# Patient Record
Sex: MALE | Race: WHITE
[De-identification: names, ages, dates, MRNs, and addresses within clinical notes are randomized per-mention and may not be internally consistent; named-entity substitution may affect disease eponyms.]

---

## 2018-10-02 NOTE — ASMTTLCEVL
TLC Evaluation - Basic Information

 

Evaluation Start Date and     10/02/2018 06:45 PM

Time                          

Hospital Status               Answers:  M1 Hold                               

72-hr M1 Hold Start Date      10/02/2018 04:05 PM

and Time                      

Patient statement             

Notes:

 Rox been really struggling with suicidal thoughts and depression but more anxiety than 

depression.  

Narrative                     

Notes:

Pt is a 17 year old  male who presented to [facility] Ed with gastric but also presents with 

complaints of worsening despair and depression over the last 2 months since his break up with his 

GF 2 months ago.  Pt stated he feels like he has been doing everything he can to feel better and he 


has reached out to suicide prevention hotlines, and reached out to his school counselors and 

therapist at [facility] 1x a month but states he thinks  it still gets the best of 

him. Pt states he needs a med change. He states he has been taking Prozac for 1 .5 years and has 

had negative side effects such as low libido, apathy and low energy and stated these side effects 

are the same regardless of the dose. Pt reports he has been on 40 mg at one point and had the same 

side effects.  Pt also reports he was on Zoloft but that drug was not helpful.  Pt reported last 

night he had a plan and thought about hanging himself but stated his protective factors are,  I 

always want to give it one more chance and stated,  I dont think I would have done it.Pt is denying 


SI at this time and states, I do want to get my life back on track.

Diagnosis History             

Notes:

Pt reported a hx of anxiety and depression.

Prior suicide attempts        

Notes:

Pt denied any suicide attempts but reported a hx of cutting. Per ED physician report, pt reported a 


suicide attempt 1 month ago which pt is denying to both this writer and nurse. 

Prior hospitalizations        

Notes:

Pt denied any prior hospitalizations.

Treatment Responses           

Notes:

N/A

History of violence           

Notes:

Pt denied any hx of violence.

Therapist:                    [facility]

Medications                   

(name, dosage, route, freq    

uency)                        

Notes:

Prozac  10 mg; nexium

Allergies/Reaction            

Notes:

Nka

Sleep                         

Notes:

Pt reports decreased sleep.

Appetite                      

Notes:

Pt reports a decrease in appetite and weight loss of 20 lbs in the past 2 months

Medical/Surgical history      

Notes:

Pt reports having gastrointestinal issues. 

Substance use history         

(frequency, intensity, his    

tory, duration)               

Notes:

Pt denied any drug use but utox was positive for THC. Pt reports drinking alcohol occasionally 

which he just started doing 2 months ago. Pt reports he never gets drunk, just drinks socially.

Family composition            

Notes:

Pt reports his parents live in [place] and reported that his parents do not want to acknowledge his 

depression, and became upset initially when they found out he was on antidepressants. Pt sated 

eventually they became more accepting of the issue.

Need for family               Answers:  No                                    

participation in                                                              

patient's care                                                                

Family                        

psychiatric/substance         

abuse history                 

Notes:

Pt did not report any family hx. 

Developmental history         

Notes:

Pt reported growing up in [place] and [place] and reported having a good childhood. Pt denied any 

childhood abuse but stated, My parents often pushed me to do things I dont want to do, like they 

really want me to be an .  Pt denied any childhood abuse. 

Abuse concerns                Answers:  None                                  

Marital status/children       

Notes:

Unmarried, no children.

Living situation              

Notes:

Pt lives in [place], no children.

Sexual                        

history/orientation           

Notes:

Heterosexual

Peer support/family           

strengths                     

Notes:

Pt reports he has really good friends. 

Education level/history       

Notes:

Pt reports he is sophomore at [employer], almost a avelino.  Pt stated he normally is doing really 

well but this semester he has started slipping a little.

Work history                  

Notes:

Pt reported he works about 7 hours a week as a .

                      

Notes:

None reported.

Legal                         

Notes:

Pt denied any legal problem.

Anglican/Spiritual           

Notes:

None reported. 

Leisure                       

Notes:

Playing tennis, hanging with friends, watching interesting shows.[employer]

Collateral                    

Notes:

[employer]

Patient's strengths           Answers:  Artistic/Creative/Musical             

(Please select at least                                                       

TWO strengths):                                                               

                                        Intelligent                           

                                        Willingness                           

TLC Evaluation - Mental Status Exam

 

Appearance:                   Answers:  Appropriate                           

Eye Contact:                  Answers:  Good/Direct                           

Mood:                         Answers:  Sad                                   

Affect:                       Answers:  Anxious                               

Behavior:                     Answers:  Cooperative                           

                                        Anxious                               

Speech:                       Answers:  Relevant                              

                                        Logical                               

                                        Clear                                 

Thought Process:              Answers:  Organized                             

                                        Oriented                              

                                        Alert                                 

Insight:                      Answers:  Good                                  

Judgement:                    Answers:  Fair                                  

Depression                    Answers:  Diminished Interest                   

Signs/Symptoms:                                                               

                                        Diminished Pleasure                   

                                        Sad Mood                              

Anxiety Signs/Symptoms        Answers:  Generalized Anxiety                   

Hallucinations:               Answers:  None                                  

Pt reported to have           Answers:  No                                    

suicidal/self-injuring                                                        

ideation/behavior?                                                            

Pt reported to be making      Answers:  Yes                                   

suicidal/self-injuring                                                        

threats?                                                                      

Pt reported to be making      Answers:  No                                    

aggression/assault                                                            

threats?                                                                      

Pt exhibits inability to      Answers:  No                                    

care for self/grave                                                           

disability?                                                                   

Ideation/behavior is          Answers:  Yes                                   

chronic?                                                                      

Patient has a specific        Answers:  Yes                                   

plan?                                                                         

Pt has access to means to     Answers:  Yes                                   

execute the plan?                                                             

Ideation involves             Answers:  Yes                                   

serious/lethal intent?                                                        

Ideation has                  Answers:  No                                    

delusional/hallucinatory                                                      

content?                                                                      

History of                    Answers:  Yes                                   

suicidal/self-injuring                                                        

ideation, behavior, or                                                        

threats?                                                                      

History of                    Answers:  No                                    

aggressive/assaultive                                                         

ideation, behavior, or                                                        

threats?                                                                      

History of serious            Answers:  No                                    

physical harm to                                                              

self/others while in                                                          

treatment setting?                                                            

TLC Evaluation - Suicide/Homicide Risk

 

Suicide Risk Factors:         Answers:  < 20 or > 40 Years of Age             

                                        Anxiety/Panic, Severe                 

                                        Major Depression                      

                                        Self-Harm Behaviors                   

Current Suicidal              Answers:  No                                    

Ideation?                                                                     

Current Suicidal Ideation     Answers:  Yes                                   

in the Past 48 Hours?                                                         

Current Suicidal Ideation     Answers:  Yes                                   

in the Past Month?                                                            

Current Suicidal              Answers:  Yes                                   

Ideation, Worst Ever?                                                         

Suicide Internal              Answers:  Absence of Psychosis                  

Protective Factors:                                                           

Suicide External              Answers:  Positive Therapeutic                  

Protective Factors:                     Relationships                         

                                        Social Support                        

Ranking of patient's          Answers:  Severe                                

suicidal risk:                                                                

Ranking of patient's          Answers:  Low                                   

homicidal risk:                                                               

TLC Evaluation - Wrap-up

 

AXIS I Diagnosis (include     

DSM-V and ICD-10              

codes), must also be          

entered in                    

[employer], which is the        

source of truth.              

Notes:

Major Depressive Disorder, recurrent, severe  296.33  (F33.2) 

Generalized Anxiety Disorder  300.02  (F41.1)

 In consultation with [facility] ED physician, Jasiel Alexandra MD and on-call psychiatrist, Moira Osman MD, both concurred that pt appears to meet 27-65 criteria requiring psychiatric 

hospitalization as pt appears to be at risk of harm to self due to a mental illness condition. Pt 

was given the 3N prohibited belongings list while in the ED.

Evaluation End Date and       10/02/2018 08:15 PM

Time (HH:ISABELLA):                 

 

Date Signed:  10/02/2018 08:15 PM

Electronically Signed By:Gisela Alexander

## 2018-10-02 NOTE — EDPHY
H & P


Stated Complaint: gen abd pain, N/V, feeling anxious w/SI


Time Seen by Provider: 10/02/18 15:58


HPI/ROS: 


HPI:  This is a 17-year-old male who presents with





Chief Complaint: gen abd pain, N/V, feeling anxious w/SI





Location: psych 


Quality:  Suicidal ideation


Duration:  2 months


Signs and Symptoms: no fever, no nausea, no vomiting, no hematemesis, no blood 

in stool, no abdominal bloating, no diarrhea, no back pain, no urinary symptoms

, no testicular/groin pain, no indigestion, no chest pain, no shortness of 

breath


Timing:  Worse over the last week


Severity:  Severe


Context:  Patient has a history of major depression, anxiety disorder, gastric 

also presents with complaints of worsening despair and depression over the last 

2 months since his break-up with his girlfriend 2 months ago.  He reports that 

he is taking his Zoloft daily but does not like the decreased libido side 

effects.  He reports that 1 month ago he attempted suicide.  He also has a 

history of cutting with razors denies any recent cutting.  Patient reports that 

he came to the emergency room today because he does not feel safe being by 

himself.  He is originally from [place], [place].  Is the computer science 

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  Patient reports that he has had 

decreased appetite, increased sleeping patterns, excessive crying.  Patient 

reports that over the last week he has had multiple thoughts during the day of 

hanging himself in order to and"the pain."Patient has a history of gastric 

ulcer diagnosed 2 years ago by endoscopy from a gastroenterologist and all [place], [place].  He takes Nexium 20 mg twice daily.  He complains of generalized 

abdominal pain worse in the epigastric area accompanied by nausea.  He believes 

when he is crying he vomits due to"being worked up." He denies any fever, back 

pain, testicular groin pain, urinary symptoms.  He has not eaten in 1 day due 

to decreased appetite.  Denies any diarrhea. 


Modifying Factors:  None





Comment: 








ROS:  A comprehensive 10 system review of systems is otherwise negative aside 

from elements mentioned in the history of present illness. 





MEDICAL/SURGICAL/SOCIAL HISTORY: 


Medical history:  Gastric ulcer, anxiety, depression


Surgical history:  Denies


Social history:  Never smoked.  Family history noncontributory.








CONSTITUTIONAL:  Flat affect, nontoxic appearing, teenage  male, awake 

and alert, no obvious distress


HEENT: Atraumatic and normocephalic, PERRL, EOMI.  Nares patent; no rhinorrhea;

  no nasal mucosal edema. Tympanic membranes clear. Oropharynx clear, no 

exudate and moist pink mucosa.  Airway patent.  No lymphadenopathy.  No 

meningismus.


Cardiovascular: Normal S1/S2, regular rate, regular rhythm, without murmur rub 

or gallop.


PULMONARY/CHEST:  Symmetrical and nontender. Clear to auscultation bilaterally. 

Good air movement. No accessory muscle usage.


ABDOMEN:  Soft, nondistended, nontender, no rebound, no guarding, no peritoneal 

signs, no masses or organomegaly. No CVAT.


EXTREMITIES:  2/2 pulses, strength 5/5, no deformities, no clubbing, no 

cyanosis or edema.


NEUROLOGICAL: no focal neuro deficits.  GCS 15.


SKIN: Warm and dry, no erythema. no rash.  Good capillary refill. 


PSYCH:  Very self critical, Poor eye contact, no flight of ideas, organized 

thought process, fair insight and judgment, no auditory hallucinations, no 

visual hallucinations,+ suicidal ideation with a plan, no homicidal ideation, 

no paranoia








Source: Patient


Exam Limitations: No limitations





- Medical/Surgical History


Hx Asthma: No


Hx Chronic Respiratory Disease: No


Hx Diabetes: No


Hx Cardiac Disease: No


Hx Renal Disease: No


Hx Cirrhosis: No


Hx Alcoholism: No


Hx HIV/AIDS: No


Hx Splenectomy or Spleen Trauma: No


Other PMH: gastric ulcer.  anxiety depression





- Social History


Smoking Status: Never smoked


Constitutional: 


 Initial Vital Signs











Temperature (C)  36.5 C   10/02/18 14:42


 


Heart Rate  85   10/02/18 14:42


 


Respiratory Rate  18   10/02/18 14:42


 


Blood Pressure  134/84 H  10/02/18 14:42


 


O2 Sat (%)  97   10/02/18 14:42








 











O2 Delivery Mode               Room Air














Allergies/Adverse Reactions: 


 





No Known Allergies Allergy (Unverified 10/02/18 14:41)


 








Home Medications: 














 Medication  Instructions  Recorded


 


Nexium  10/02/18


 


Prozac 10 MG (*)  10/02/18














Medical Decision Making





- Diagnostics


Imaging Results: 


 Imaging Impressions





Abdomen X-Ray  10/02/18 16:09


Impression: Negative.











ED Course/Re-evaluation: 


1605: Placed on M1 hold upon arrival due to patient having severe major 

depression with suicidal ideation with a plan.


Labs, abdominal x-ray to evaluate for free air secondary to gastric perforation

, oral medications, urine drug screen ordered


I suspect that his abdominal pain is related to his gastric ulcer and anxiety.  

Patient was given GI cocktail with adequate relief of pain.


Abdomen is soft and nontender.  Doubt surgical process. 


1640:  Urine drug screen positive for marijuana. 


1645:  Abdominal x-ray reviewed via PACs and shows nonobstructive bowel gas 

pattern.  No free air. 


1755:  Labs reviewed.  No signs of leukocytosis/anemia/platelet dysfunction/SON/

elevated LFTs/electrolyte imbalance.  Medically clear for mental health 

evaluation. 


2052:  Mental health recommends inpatient psychiatric admission.  Patient has 

been accepted at [address] [place] [address] by Dr. Osman. EMTALA form completed by Dr. Alexandra. 














This patient was seen under the supervision of my secondary supervising 

physician.  I evaluated care for this patient independently.  Discussed this 

patient with Dr. Alexandra.  





Differential Diagnosis: 


Differential diagnosis includes but is not limited to major depression, bipolar 

disorder, suicidal ideation, generalized anxiety disorder.








- Data Points


Laboratory Results: 


 Laboratory Results





 10/02/18 16:50 





 10/02/18 16:50 





 











  10/02/18 10/02/18 10/02/18





  16:50 16:50 16:00


 


WBC    9.07 10^3/uL 10^3/uL  





    (3.80-9.50)  


 


RBC    7.89 10^6/uL H 10^6/uL  





    (4.40-6.38)  


 


Hgb    15.6 g/dL g/dL  





    (13.7-17.5)  


 


Hct    49.2 % %  





    (40.0-51.0)  


 


MCV    62.4 fL L fL  





    (81.5-99.8)  


 


MCH    19.8 pg L pg  





    (27.9-34.1)  


 


MCHC    31.7 g/dL L g/dL  





    (32.4-36.7)  


 


RDW    19.3 % H %  





    (11.5-15.2)  


 


Plt Count    215 10^3/uL 10^3/uL  





    (150-400)  


 


MPV    TNP   





    


 


Neut % (Auto)    67.7 % %  





    (39.3-74.2)  


 


Lymph % (Auto)    22.2 % %  





    (15.0-45.0)  


 


Mono % (Auto)    7.2 % %  





    (4.5-13.0)  


 


Eos % (Auto)    2.3 % %  





    (0.6-7.6)  


 


Baso % (Auto)    0.3 % %  





    (0.3-1.7)  


 


Nucleat RBC Rel Count    0.0 % %  





    (0.0-0.2)  


 


Absolute Neuts (auto)    6.14 10^3/uL 10^3/uL  





    (1.70-6.50)  


 


Absolute Lymphs (auto)    2.01 10^3/uL 10^3/uL  





    (1.00-3.00)  


 


Absolute Monos (auto)    0.65 10^3/uL 10^3/uL  





    (0.30-0.80)  


 


Absolute Eos (auto)    0.21 10^3/uL 10^3/uL  





    (0.03-0.40)  


 


Absolute Basos (auto)    0.03 10^3/uL 10^3/uL  





    (0.02-0.10)  


 


Absolute Nucleated RBC    0.00 10^3/uL 10^3/uL  





    (0-0.01)  


 


Immature Gran %    0.3 % %  





    (0.0-1.1)  


 


Immature Gran #    0.03 10^3/uL 10^3/uL  





    (0.00-0.10)  


 


Platelet Estimate    Pending   





    


 


Smear Review By    Pending   





    


 


Sodium  139 mEq/L mEq/L    





   (135-145)   


 


Potassium  4.0 mEq/L mEq/L    





   (3.3-5.0)   


 


Chloride  102 mEq/L mEq/L    





   ()   


 


Carbon Dioxide  28 mEq/l mEq/l    





   (22-31)   


 


Anion Gap  9 mEq/L mEq/L    





   (8-16)   


 


BUN  10 mg/dL mg/dL    





   (7-23)   


 


Creatinine  0.9 mg/dL mg/dL    





   (0.7-1.3)   


 


Estimated GFR  > 60     





    


 


Glucose  95 mg/dL mg/dL    





   ()   


 


Calcium  10.4 mg/dL mg/dL    





   (8.5-10.4)   


 


Total Bilirubin  0.9 mg/dL mg/dL    





   (0.1-1.4)   


 


AST  20 IU/L IU/L    





   (17-59)   


 


ALT  26 IU/L IU/L    





   (21-72)   


 


Alkaline Phosphatase  88 IU/L IU/L    





   ()   


 


Total Protein  7.9 g/dL g/dL    





   (6.3-8.2)   


 


Albumin  4.6 g/dL g/dL    





   (3.5-5.0)   


 


Urine Opiates Screen      NEGATIVE 





     (NEGATIVE) 


 


Urine Barbiturates      NEGATIVE 





     (NEGATIVE) 


 


Ur Phencyclidine Scrn      NEGATIVE 





     (NEGATIVE) 


 


Ur Amphetamine Screen      NEGATIVE 





     (NEGATIVE) 


 


U Benzodiazepines Scrn      NEGATIVE 





     (NEGATIVE) 


 


Urine Cocaine Screen      NEGATIVE 





     (NEGATIVE) 


 


U Marijuana (THC) Screen      NON-NEGATIVE  H 





     (NEGATIVE) 


 


Ethyl Alcohol  < 10 mg/dL mg/dL    





   (0-10)   











Medications Given: 


 








Discontinued Medications





Al Hydroxide/Mg Hydroxide (Maalox Susp)  30 ml PO ONCE ONE


   Stop: 10/02/18 16:10


   Last Admin: 10/02/18 16:42 Dose:  30 ml


Hyoscyamine Sulfate (Levsin, Hyomax-Sl)  0.25 mg PO ONCE ONE


   Stop: 10/02/18 16:10


   Last Admin: 10/02/18 16:42 Dose:  0.25 mg


Lidocaine (Lidocaine 2% Viscous)  15 ml PO ONCE ONE


   Stop: 10/02/18 16:10


   Last Admin: 10/02/18 16:42 Dose:  15 ml








Departure





- Departure


Disposition: [facility] IP


Clinical Impression: 


 Severe major depression without psychotic features, Depression with suicidal 

ideation





Condition: Fair

## 2018-10-02 NOTE — ASMTTCLDSP
TLC Discharge Disposition

 

Disposition:                  Answers:  Admit                                 

Discharge                     

Concerns/Recommendations:     

Notes:

In consultation with [facility] ED physician, Jasiel Alexandra MD and on-call psychiatrist, Moira Osman MD, both concurred that pt appears to meet 27-65 criteria requiring psychiatric 

hospitalization as pt appears to be at risk of harm to self due to a mental illness condition. Pt 

was given the 3N prohibited belongings list while in the ED.

Was patient given the         Answers:  Yes                                   

Inpatient Behavioral                                                          

Health Prohibited                                                             

Belongings List while in                                                      

the ED?                                                                       

For inpatient                 Moira Osman MD

admission, the following      

psychiatrist agreed to        

accept patient for            

admission to Behavioral       

Health ([facility]):              

 

Date Signed:  10/02/2018 08:16 PM

Electronically Signed By:Gisela Alexander

## 2018-10-03 NOTE — BCON
INTERNAL MEDICINE CONSULTATION



DATE OF CONSULTATION:  10/03/2018



REFERRING PHYSICIAN:  Moira Osman MD



REASON FOR REFERRAL:  Medical clearance for inpatient behavioral health stay.



HISTORY OF PRESENT ILLNESS:  This patient came to the emergency department yesterday complaining of d
epression for 2 months since a break-up with his girlfriend.  He reported a suicide attempt approxima
tely a month prior and some cutting with razors in the past.  He did not feel safe being by himself. 
 He was evaluated by the mental health team and admitted for further psychiatric care. 



He also was complaining of abdominal pain with a history of gastric ulcer and currently complains of 
abdominal pain and reports that he has not been taking his usual proton pump inhibitor for which his 
outpatient prescription is esomeprazole.  He is otherwise without any acute complaints.



PAST MEDICAL HISTORY:  

1.  Depression.

2.  Gastric ulcer.



MEDICATIONS:  

1.  Esomeprazole.

2.  Fluoxetine 10 mg daily.



ALLERGIES:  There are no known drug allergies.



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  He is a no
nsmoker and nondrinker.



FAMILY HISTORY:  Noncontributory.



REVIEW OF SYSTEMS:  He denies weight loss.  He reports his appetite has returned.  He denies nausea, 
vomiting, constipation, or diarrhea.  Otherwise, a 10-point review of systems is negative.



PHYSICAL EXAM:  VITAL SIGNS:  Blood pressure is 125/77, heart rate is 88, respiratory rate is 14, oxy
gen saturation is 97% on room air.  Temperature is 36.9 degrees centigrade.  His weight is 65.8 kg fo
r a body mass index of 20.8.  GENERAL:  This is a well-nourished, well-developed man sitting up in 
d, wearing street clothes, cooperative and in no acute distress.  HEENT:  Extraocular movements are i
ntact.  Mucous membranes are moist.  Dentition is in good condition.  He has an uncrowded airway, Mal
lampati class 2.  NECK:  Supple.  HEART:  Regular rate and rhythm with no murmurs, rubs, or gallops. 
 LUNGS:  Clear to auscultation bilaterally.  ABDOMEN:  Benign.  EXTREMITIES:  No cyanosis, clubbing, 
or edema.  NEUROLOGIC:  He is alert and oriented x3.  There is no focal weakness, and sensation is in
tact to light touch.



LABORATORY DATA:  From the emergency department:  CBC showed normal hemoglobin and hematocrit, but he
 had a low MCV at 62.4 and a low MCH at 19.8.  Peripheral smear showed polychromasia, microcytosis, a
nd elliptocytes.  Serum chemistry showed normal renal function, electrolytes, and liver functions.  H
emoglobin A1c was normal at 5.7.  Lipid panel showed a slightly high cholesterol at 177 with an LDL o
f 119, and an HDL of 38.  Toxicology screen in the urine was non-negative for marijuana, but otherwis
e negative for substances of abuse.  Toxicology screen in the serum was negative for ethyl alcohol.



ASSESSMENT/RECOMMENDATIONS:  

1.  Mental health issues pending further evaluation and management per Psychiatry and the mental [facility] team.

2.  Thalassemia.  He reports that his mother has it also.  He has a normal hemoglobin and hematocrit.
  There is no need for any further evaluation or management.  He can follow up with primary care.

3.  Abdominal pain with a prescription for esomeprazole and history of a gastric ulcer.  Pantoprazole
 has been ordered on a p.r.n. basis and I will schedule it at 40 mg twice daily.  He can follow up wi
th his primary care provider or Gastroenterology after discharge. 





I see no medical contraindications to this patient's continued stay on the inpatient behavioral healt
h unit or to any psychiatric medications or procedures. 



Thank you very much for including me in the care of this patient and please do not hesitate to contac
t me or the hospitalist service, should there be need for further medical evaluation.





Job #:  [id number]

## 2018-10-03 NOTE — BAPA
DATE OF SERVICE:  10/03/2018



CHIEF COMPLAINT:  "I went to the ER because I've been having increased suicidal 
thoughts and feeling more depressed and I just feel like I could use some help.
"


HISTORY OF PRESENT ILLNESS:  From the ER note dated 10/02/2018, the patient 
presented to the ER with worsening despair and depression over the past 2 
months since his break-up with his girlfriend 2 months ago.  The patient 
reports a recent suicide attempt 1 month ago.  There are no details regarding 
the suicide attempt noted in the ED note.  The patient reports a history of 
cutting with razors and denied any recent cutting.  The patient reports he 
presented to the emergency room because he does not feel safe being by himself.
  The patient was admitted involuntarily and is on an M1 hold due to being a 
danger to himself and is hospitalized for safety crisis stabilization and 
medication evaluation.  The patient describes to this NP circumstances that led 
to current hospitalization as a recent break-up about 2 months ago with his 
girlfriend, increased stressor with starting college, and reports a history of 
having suicidal ideation about every month or so.  The patient reports no 
history of specific plan to complete suicide.  The patient reports to this NP 
current mental health illness as depression and anxiety.  The patient reports 
he was not using alcohol or any other substances prior to his hospitalization 
and no alcohol or substances contributed to his current hospitalization.  The 
patient reports no current psychiatric symptoms to this NP.  The patient 
describes this NP abuse history as none.  The patient denies psychiatric 
symptoms, including symptoms of depression, andrea, anxiety, ADHD, OCD, PTSD, 
psychosis, and any other symptom of psychiatric disorder.  The patient does 
report history of depression symptoms and anxiety symptoms.  The patient 
reports he has felt depressed mood in the past, reports fatigue and loss of 
energy, feelings of worthlessness and excessive guilt, and does report a 
history of suicidal ideation.  The patient reports a history of anxiety symptoms
, including excessive worry.  Reports that at times it is difficult to control 
his worry, feels restless and keyed up and on edge at times, difficulty 
concentrating, and at times sleep disturbance. The patient describes to this NP 
current psychiatric symptoms are impacting managing his day-to-day life, 
described as attending to household responsibilities without difficulty.  The 
patient reports he works part time without difficulty.  The patient reports he 
is currently socially active and socializes without any difficulty.  The 
patient reports he gets along well with his family and his family is very 
supportive.  The patient reports he is doing well in school, currently getting A
's and B's in his [employer] year, and is currently studying computer programming.  
The patient reports hobbies as playing tennis and flying remote control 
airplanes.  The patient states he is generally satisfied with his life.  The 
patient denies current suicidal ideation and reports last suicidal ideation was 
yesterday prior to his admission.  The patient reports several protective 
factors or reasons to live, including family and friends.  The patient reports 
future goal as focusing more on school.  The patient reports his main support 
network is his family and reports "They are always there for me."  The patient 
denies current homicidal ideation.  The patient denies current self-injurious 
ideation.  The patient reports he currently sees his primary care provider for 
medications and sees a therapist at [facility].



PAST PSYCHIATRIC HISTORY:  The patient describes to this NP the following 
psychiatric history:  The patient reports past diagnoses of both depression and 
anxiety.  The patient reports past psychotropic medication trials as Prozac and 
Zoloft.  The patient reports he did not like the side effects from these 
medications, including feeling more fatigued after taking them and reports 
history of decreased libido on these medications.  The patient reports he has 
never been hospitalized in inpatient psychiatric hospital.  The patient denies 
any history of withdrawal from drugs or alcohol.  The patient denies history of 
a suicide attempt.  The patient reports self-injurious behavior approximately 8-
9 months ago and reports cutting. The patient reports he has never done this 
before and has not done it since.



ALLERGIES:  No known allergies.



CURRENT MEDICATIONS:  Prozac 10 mg p.o. at bedtime as the patient's current 
psychotropic medication for anxiety and depression.



PAST MEDICAL HISTORY:  The patient describes to this NP the following pertinent 
past medical history:  The patient denies history of neurological brain disease
, traumatic brain injury or concussions.  The patient denies any history of 
major illnesses and denies any history of major hospitalizations.



SOCIAL HISTORY:  The patient describes to this NP the following social history:
  The patient reports he was born in [place] and raised the majority of his 
life in [place], [place] by both parents.  The patient reports he currently 
lives in [place], [place] with 3 roommates.  The patient reports meeting all 
his developmental milestones.  Reports no learning delays or difficulties and 
describes his sexual orientation as heterosexual.  The patient reports he is 
currently not in a relationship, has never been  and has no children.  
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 
.  He states he is currently a avelino in college.  The patient denies 
any history of  duty, reports no Confucianism or spiritual practice, and 
denies any past or current legal problems.



SUBSTANCE USE HISTORY:  The patient reports he has drank alcohol 2-3 times in 
his entire life.  The patient denies a history of nicotine, marijuana, meth, 
cocaine, crack, heroin, prescription medication abuse, or any other abuse of 
substances.



FAMILY PSYCHIATRIC HISTORY:  The patient describes to this NP the following 
family psychiatric history:  The patient reports no family history of mental 
illness, no family history of suicide, and no family history of substance abuse.



ADMISSION LABS AND STUDIES:  CBC from 10/02/2018 within normal limits except 
RBC was elevated at 7.89, MCV was low at 62.4, MCH was low at 19.8, MCHC was 
low at 31.7, RDW was elevated at 19.3.  BMP from 10/02/2018 within normal 
limits.  Hemoglobin A1c from 10/02/2018 within normal limits.  Liver function 
tests from 10/02/2018 within normal limits.  Fasting lipid panel from 10/02/
2018 within normal limits except cholesterol was elevated at 177, non-HDL 
cholesterol was elevated at 139.  Toxicology screen from 10/02/2018 was non-
negative for THC, negative for all other substances of abuse, and negative for 
ethyl alcohol.



MENTAL STATUS EXAM:  



DIAGNOSIS:  

1.  Major depressive disorder, severe, with anxious distress.  

2.  Adjustment disorder with mixed disturbance of emotions and conduct.

3.  Cannabis use disorder is suspected.



FORMULATION:  The patient is a 17-year-old male, single, works part-time, is a 
full-time student in a [employer] program, living in [place], [place] with 3 roommates, who presents to the hospital involuntarily due to a 
risk to harm himself and is currently on an M1 hold. The patient requires 
continued inpatient care because of recent suicidal ideation.  The patient 
presents with problems of increased depression and suicidal ideation that have 
been steadily increasing over the past several months.  The patient's life has 
been affected by these problems, including the crisis that led to this 
hospitalization.  The exacerbation of symptoms was likely preceded by patient's 
recent break-up with his girlfriend approximately 2 months ago.  The patient 
has a past psychiatric history of depression and anxiety that have been treated 
with both Prozac and Zoloft and the response to treatment has been poor due to 
the patient's complaints of side effects.  The patient is a moderate to high 
suicide safety risk due to recent suicidal ideation.  Protective factors while 
hospitalized include ongoing safety checks, active involvement in treatment, 
and support from the treatment team.  The patient could benefit from inpatient 
hospitalization for safety crisis stabilization and medication evaluation.



PLAN:  

(1) Psychotropic medications:  After reviewing options, risks, and benefits 
with the patient, the patient agrees to discontinue Prozac and begin a trial of 
Effexor XR 75 mg p.o. daily.  No other medication changes at this time as more 
time is needed to determine ongoing tolerability and efficacy.  Plan is to 
continue to observe patient for response and side effects from medications, and 
ongoing monitoring and evaluation.  

(2) Review with patient informed consent and recommendations for psychotropic 
medication treatment listed below

(3) Labs: no additional labs at this time.

(4) Therapy: continue milieu and group therapy  

(5) Further investigation including gathering information from patients 
relatives and review of past case records to inform treatment plan.

(6) Safety/Wellness plan and follow-up outpatient appointments to be 
established prior to discharge.  Next steps are for patient to meet with care 
manager to plan a safe discharge plan and establish outpatient services for 
ongoing treatment.  

(7) Confer with inpatient treatment team regarding treatment plan.  

(8) Legal status: M1

(9) Consider discharge on Thursday if patient is in stable condition, safe, and 
has a safe discharge plan.   

(10) Substance abuse interventions: cannabis 



ESTIMATED LENGTH OF STAY: 1-3 days



PSYCHOTROPIC MEDICATION TREATMENT INFORMED CONSENT and RECOMMENDATIONS: Review 
nature of condition, diagnosis, and prognosis.  Review nature and purpose of 
psychotropic medication treatment.  Review type of psychotropic medications 
being ordered.  Review risk and benefits of psychotropic medication treatment.  
Review probable length of time will need to take medications.  Review risk and 
benefits of not undergoing psychotropic medication treatment.  Review 
alternative treatments to psychotropic medications.  Review psychotropic 
medications contraindications, drug-drug interactions, side effects, and 
importance of reporting any side effects to a psychiatric provider or nurse 
during inpatient hospitalization, and upon discharge to patients psychiatric 
outpatient provider, primary care provider, or other health care professional.  
Review importance of asking a nurse, psychiatric provider, or primary care 
provider any questions or problems concerning the psychotropic medications.  
Verifty patient understands the information that has been provided, and 
understands, accepts, and agrees to psychotropic medications.  



Review patients safety plan and importance of patient to communicate to staff 
while hospitalized if patient is ever a danger to self/others, or unable to 
care for self, and upon discharge, the importance for patient to contact 
[facility] or [facility], or go to the nearest emergency room, if 
patient is ever a danger to self/others, or unable to care for self.  Recommend 
that upon discharge patient establish medication management treatment with a 
psychiatric provider, establishes routine therapy appointments, and follow-up 
with primary care provider.  Verify patient understands and agrees to these 
recommendations.



Job #:  [id number]

MTDD

## 2018-10-03 NOTE — PDMN
Medical Necessity


Medical necessity: Pt meets inpt criteria per MD order and [facility], Major 

Depressive Disorder, Adult: Inpatient Care, 3 days. Pt admitted on M1 Hold due 

to being danger to self w/ major depressive disorder, severe, w/anxious distress

, adjustment disorder, and cannabis use disorder requiring inpt psychiatric 

hospitalization for safety crisis stabilization and medication evaluation, 

anticipate>2MN.

## 2018-10-03 NOTE — ASMTBHDC
Notes

 

Note:                         

Notes:

CC confirmed client's discharge follow up care:



Follow up with:





[facility] ([facility] location)

[address] [place] [phone]



Next Appt: Tuesday October 9th, (10/9/18) at 11am***

 

Date Signed:  10/03/2018 11:19 AM

Electronically Signed By:Marco Aceves

## 2018-10-03 NOTE — ASMTBHMTP
Master Treatment Plan

 

Master Treatment Plan         Answers:  Depressed Mood with                   

for:                                    Suicidal Ideation                     

Date:                         10/02/2018

Diagnosis on Admission:       Major Depressive Disorder, Recurrent, Severe 296.33 

                              (F33.2)

Expected length of stay:      3-5 days

Reason for admission:         

Notes:

Pt is a 17 year old  male who presented to [facility] with gastric but also presents with 

complaints of worsening despair and depression over the last 2 months since his break up with his 

GF 2 months ago.  Pt stated he feels like he has been doing everything he can to feel better and he 


has reached out to suicide prevention hotlines, and reached out to his school counselors and 

therapist at [address] a month but states he thinks  it still gets the best of 

him. Pt states he needs a med change. He states he has been taking Prozac for 1 .5 years and has 

had negative side effects such as low libido, apathy and low energy and stated these side effects 

are the same regardless of the dose. Pt reports he has been on 40 mg at one point and had the same 

side effects.  Pt also reports he was on Zoloft but that drug was not helpful.  Pt reported last 

night he had a plan and thought about hanging himself but stated his protective factors are,  I 

always want to give it one more chance and stated,  I dont think I would have done it.Pt is denying 


SI at this time and states, I do want to get my life back on track.

Patient's stated              

presenting problems:          

Notes:

"to get sooner appointments for out-patient therapies." 

Patient's goals for           

treatment:                    

Notes:

"to find resources for anxiety, depression and start taking medications." 

Patient's strengths:          

Notes:

smart

Identify supports outside     

of hospital:                  

Notes:

family and friends

Discharge criteria:           

Notes:

Suicidal Ideation will resolve and patient will have a plan to safely manage recurrent suicidal 

ideaitons. 

Initial disposition           

plan/considerations:          

Notes:

"go back to my apartment and re-start school." 

Master Treatment Plan Required Signatures

 

Psychiatrist signature:       Answers:  Psychiatrist: ______________________________

RN on-shift signature:        Answers:  RN: ____________________________________

Patient signature:            Answers:  Patient: ____________________________________

 

Date Signed:  10/03/2018 08:24 AM

Electronically Signed By:Marco Aceves

## 2018-10-04 NOTE — BDS
REASON FOR ADMISSION:  From the ED note dated 10/02/2018, this patient 
presented to the ED with complaints of worsening despair and depression over 
the last 2 months since his break-up with his girlfriend 2 months ago.  The 
patient reported he presented to the emergency department because he does not 
feel safe being by himself.  The patient was admitted involuntarily on an M1 
hold due to being a danger to himself.  The patient was admitted for safety, 
crisis stabilization, and medication management.



ADMISSION DIAGNOSES:  

1.  Major depressive disorder, recurrent episode, severe, with anxious distress.

2.  Adjustment disorder with mixed disturbance of emotions and conduct.

3.  Cannabis use disorder, mild, is suspected.



ADMISSION PHYSICAL EXAM:  The patient was seen by Dr. Carl on 10/03/2018, for 
an Internal Medicine consultation for medical clearance for inpatient 
Behavioral Health stay.  Dr. Carl reported he saw no medical 
contraindications to the patient's continued stay on the inpatient behavioral 
health unit or to any psychiatric medications or procedures.  For further 
details, please refer to Dr. Carl's Internal Medicine consultation note dated 
10/03/2018.



LABORATORY DATA:  Admission labs from the emergency department CBC showed 
normal hemoglobin and hematocrit, but the patient had a low MCV at 62.4 and a 
low MCH at 19.8.  Peripheral smear showed polychromasia, microcytosis, 
elliptocytes.  Serum chemistry showed normal renal function, electrolytes, and 
liver functions.  Hemoglobin A1c was normal at 5.7.  Lipid panel showed a 
slightly high cholesterol at 177 with an LDL at 119, and HDL at 38.  Toxicology 
screen in the urine was non-negative for marijuana, but otherwise negative for 
substances of abuse.  Toxicology screen in the serum was negative for ethyl 
alcohol.



MAJOR PROCEDURES/TESTS:  None.



HOSPITAL COURSE:  The most prominent symptoms and behaviors while the patient 
was here were moderate anxiety and mild depression.  Treatment modalities 
utilized were milieu and group therapy.  Effexor XR 75 mg p.o. daily was 
started to target depression and anxiety symptoms, was tolerated with no report 
of side effects and with good response.  Patient has improved considerably with 
no signs of psychiatric symptoms and no psychiatric symptoms expressed at 
discharge.  Patient reports he has improved since admission.  States to be in 
stable condition, feels safe to discharge, and he contracts for safety.  Patient
's response to treatment was good.  There were no adverse or unexpected results 
of treatment.  The patient was safe throughout his stay, active in treatment, 
engaged in groups, and was appropriate with staff and other patients.  The 
patient met with the treatment team prior to discharge to assess readiness to 
discharge and reviewed discharge plan the treatment team consensus is the 
patient is in stable condition, has a safe discharge plan, and is ready to 
discharge today.



CONDITION AT DISCHARGE:  Patient is in stable condition and is no longer a 
danger to self or others, and is not gravely disabled due to mental illness.  
Patient is no longer in need of inpatient level of care, and can be safely and 
effectively treated within the community.  The patients level of risk at time 
of discharge is low.  MSE: The patient is casually dressed and with good hygiene
, and looks stated age.  Patient is sitting, posture is upright, and position 
is relaxed.  Patient appears awake, alert, and responds appropriately and 
reasonably during interview.  Patient is engaged, relates well to interviewer, 
and emotional facial expression is appropriate to situation and changes 
appropriately with topic.  Patient is cooperative, makes comfortable eye contact
, and movements are voluntary, deliberate, coordinated, and smooth and even 
with no inappropriate movements.  Patient makes laryngeal sounds effortlessly 
and shares conversation appropriately; pace of conversation is appropriate, and 
stream of talking is fluent; articulation is clear and understandable; word 
choice is effortless and appropriate for education level; completes sentences, 
occasionally pausing to think; rate and volume are appropriate for interview 
and setting.  Patient reports mood as euthymic.  Patients affect is stable with 
full variable range, congruent with mood, and appropriate to speech and 
circumstances.  Patient has linear and logical thinking, with no loose 
associations, tangential thought, thought blocking, concrete thinking, or any 
other signs of formal thought disorder.  Patient denies suicidal and homicidal 
ideation, and denies hallucinations and delusions.  Patient appears to be a 
reliable historian with sound judgement and good insight into current 
condition.  Patient has no apparent dysfunction in recent or remote memory noted
, and no evidence of gross cognitive dysfunction noted at any point during the 
interview.



DISCHARGE DIAGNOSES:  

1.  Major depressive disorder, recurrent episode, severe, with anxious distress.

2.  Adjustment disorder with mixed disturbance of emotions and conduct.

3.  Cannabis use disorder, mild is suspected.



CURRENT MEDICATIONS:  After reviewing options, risks, and benefits, the patient 
agrees to continue Effexor XR 75 mg p.o. daily.  The patient requests 
prescription for this medication at time of discharge.  Prescription for 30 
days is provided.  The prescription is reviewed with the patient at time of 
discharge to ensure accuracy and patient understanding.



DISPOSITION:  The patient left hospital independently and voluntarily with his 
mother and father after discharge.



FOLLOWUP:  Care coordinator reports the appropriate outpatient follow-up 
services have been established and outpatient appointments have been scheduled.
  The patient received written instructions with times and dates of outpatient 
follow-up appointments.  The following follow-up recommendations were provided 
to the patient at discharge: Continue psychotropic medications as prescribed 
and attend appointments as scheduled.  Report any side effects to a psychiatric 
outpatient provider, a primary care provider, or other health care 
professional.  Address any questions or problems concerning the psychotropic 
medications with a psychiatric outpatient provider, a primary care provider, or 
other health care professional.  Contact [facility] or [facility], or go 
to the nearest emergency room, if you are ever a danger to yourself/others, or 
unable to care for yourself.  As soon as possible, establish a routine 
medication management treatment with a psychiatric provider, establish routine 
therapy appointments, and follow-up with a primary care provider.  



SUBSTANCE ABUSE BRIEF INTERVENTION: Brief intervention regarding the risks of 
cannabis abuse is provided to patient with goal to reduce the risk of harm that 
could result from the continued use of cannabis, with the general aim to 
investigate the problem, raise awareness of problem, develop a solution with 
the patient, recommend a specific change or activity, and motivate the patient 
toward change.  Assess substance abuse behavior and give supportive advice 
about harm reduction, recommend a reduction in hazardous/at-risk consumption 
patterns, and facilitate referrals for additional specialized treatment with 
care coordinator.  Intermediate goal is for the patient to quit and attend 
outpatient substance abuse therapy.  Intervention focus on intermediate goals 
to allow for more immediate success in the treatment process to keep the 
patient motivated.  Review following with patient: Cannabis use risks: Short-
term use: impaired short-term memory, impaired motor coordination, altered 
judgement, in high doses paranoia and psychosis.  Long-term use addiction, 
diminished life satisfaction and achievement, symptoms of chronic bronchitis, 
and increased risk of chronic psychosis disorders if predisposition to such 
disorders.  In withdrawal anger, aggression irritability, anxiety and 
nervousness, decreased appetite or weight loss, restlessness, and sleep 
difficulties with strange dreams.  



OUTPATIENT SUBSTANCE ABUSE TREATMENT: Patient referred to outpatient provider 
and treatment for continued treatment related to substance abuse.



LEGAL COURSE:  The patient was admitted on an M1 hold for involuntary inpatient 
psychiatric hospitalization.  The patient discharged today independently and 
voluntarily.



ATTITUDE AT TIME OF DISCHARGE:  The patients attitude was positive at time of 
discharge, and patient reports looking forward to discharging today.  The 
patient reports he feels safe to discharge, is no longer a danger to himself or 
others, is in stable condition, and contracts for safety.  Patient states he 
will continue medications as prescribed, and establish medication management 
treatment with an outpatient provider after discharge.  Patient reports he 
understands the information that has been provided to him, and he understands, 
accepts, and agrees to psychotropic medications.  Patient describes internal 
protective factors as the coping skills he has learned while hospitalized here, 
and he plans to continue to practice these coping skills after discharge.  
Patient reports external protective factors as family and friends.  



LABORATORY/STUDIES:  There were no pending labs or studies at time of discharge.



ADVANCED DIRECTIVES:  There were no advance directives on file, and the patient 
was full code during this hospitalization.



The following psychotropic medication treatment informed consent and 
recommendations were provided to the patient at time of discharge.  Patient 
reports he understands, accepts, and agrees to the information that has been 
provided.   



PSYCHOTROPIC MEDICATION TREATMENT INFORMED CONSENT and RECOMMENDATIONS: Review 
nature of condition, diagnosis, and prognosis.  Review nature and purpose of 
psychotropic medication treatment. Review type of psychotropic medications 
being prescribed.  Review risk and benefits of psychotropic medication 
treatment.  Review probable length of time will need to take medications.  
Review risk and benefits of not undergoing psychotropic medication treatment.  
Review alternative treatments to psychotropic medications.  Review psychotropic 
medications contraindications, side effects, and importance of reporting any 
side effects to a psychiatric provider, primary care provider, or other health 
care professional.  Review importance of her asking a psychiatric provider or 
primary care provider any questions or problems concerning the psychotropic 
medications.  



Review safety plan and the importance to contact [facility] or [facility]
, or go to the nearest emergency room, if ever a danger to yourself/others, or 
unable to care for yourself.  Recommend upon discharge to establish routine 
medication management treatment with a psychiatric provider, establish routine 
therapy appointments, and follow-up with a primary care provider.  Verify 
patient understands, accepts, and agrees to the information that has been 
provided. 



SUICIDE ASSESSMENT FIVE-STEP EVALUATION AND TRIAGE



(1) RISK FACTORS: 

(a) Suicidal behavior: no history of attempt 

(b) Current/past psychiatric disorders: depression and anxiety

(c) Key symptoms: none expressed or exhibited

(d) Family history: none

(e) Precipitants/Stressors/Interpersonal: none

(f) Change in treatment: discharge from psychiatric hospital 

(g) Access to firearms: none



(2) PROTECTIVE FACTORS: 

(a) Internal: coping skills learned while hospitalized 

(b) External: family and friends



(3) SUICIDAL INQUIRY: 

(a) Ideation: none; last SI was prior to admission

(b) Plan: none 

(c) Behaviors: none; patient was safe throughout stay with no suicidal or 
parasuicidal behaviors 

(d) Intent: none



(4) RISK LEVEL: Low: modifiable risk factors, strong protective factors; no 
suicidal or self-injurious ideation.  Intervention: treatment plan to reduce 
symptoms including medications and therapy, provided emergency/crisis numbers, 
established follow-up plan, and family is supportive.



Job #:  [id number]

MTDD

## 2019-03-02 ENCOUNTER — HOSPITAL ENCOUNTER (EMERGENCY)
Dept: HOSPITAL 80 - FED | Age: 19
Discharge: HOME | End: 2019-03-02
Payer: COMMERCIAL